# Patient Record
Sex: MALE | Race: WHITE | NOT HISPANIC OR LATINO | Employment: FULL TIME | URBAN - METROPOLITAN AREA
[De-identification: names, ages, dates, MRNs, and addresses within clinical notes are randomized per-mention and may not be internally consistent; named-entity substitution may affect disease eponyms.]

---

## 2024-01-05 ENCOUNTER — OFFICE VISIT (OUTPATIENT)
Dept: CARDIOLOGY | Facility: CLINIC | Age: 59
End: 2024-01-05
Payer: COMMERCIAL

## 2024-01-05 VITALS
OXYGEN SATURATION: 96 % | HEIGHT: 69 IN | DIASTOLIC BLOOD PRESSURE: 82 MMHG | BODY MASS INDEX: 30.87 KG/M2 | WEIGHT: 208.4 LBS | HEART RATE: 76 BPM | SYSTOLIC BLOOD PRESSURE: 122 MMHG

## 2024-01-05 DIAGNOSIS — Z82.49 FAMILY HISTORY OF HEART VALVE ABNORMALITY: Primary | ICD-10-CM

## 2024-01-05 DIAGNOSIS — E78.5 HYPERLIPIDEMIA, UNSPECIFIED HYPERLIPIDEMIA TYPE: ICD-10-CM

## 2024-01-05 RX ORDER — ASPIRIN 81 MG/1
TABLET ORAL
COMMUNITY

## 2024-01-05 RX ORDER — TRIAMCINOLONE ACETONIDE 1 MG/G
1 OINTMENT TOPICAL AS NEEDED
COMMUNITY

## 2024-01-05 RX ORDER — ALBUTEROL SULFATE 90 UG/1
AEROSOL, METERED RESPIRATORY (INHALATION)
COMMUNITY

## 2024-01-05 RX ORDER — TRIAMCINOLONE ACETONIDE 1 MG/G
1 CREAM TOPICAL AS NEEDED
COMMUNITY

## 2024-01-05 RX ORDER — FLUTICASONE FUROATE AND VILANTEROL TRIFENATATE 100; 25 UG/1; UG/1
1 POWDER RESPIRATORY (INHALATION) DAILY
COMMUNITY
Start: 2023-10-12

## 2024-01-05 NOTE — PROGRESS NOTES
"[facility] Cardiology  Consultation H&P  Matty Betancourt  1965  [address]  [place] [address]     VISIT DATE:  24    PCP: Maryellen Osei MD  [address]   [place] [facility] [place] [address]    IDENTIFICATION: A 58 y.o. male Rosalina dailey    PROBLEM LIST:  Asthma  HL    188/60/80H/97  Prediabetes   A1c 6.0  Family history of valvular heart disease  Surgical history:   Hernia repair    CC:  Chief Complaint   Patient presents with    F/H heart disease     consult       Allergies  Allergies   Allergen Reactions    Naproxen Unknown - Low Severity     Makes tongue raw       Current Medications  Current Outpatient Medications   Medication Instructions    albuterol sulfate  (90 Base) MCG/ACT inhaler     aspirin (ASPIR) 81 MG EC tablet Oral    Breo Ellipta 100-25 MCG/ACT aerosol powder  1 puff, Inhalation, Daily    triamcinolone (KENALOG) 0.1 % cream 1 application , As Needed    triamcinolone (KENALOG) 0.1 % ointment 1 application , Topical, As Needed        History of Present Illness   HPI  Matty Betancourt is a 58 y.o. year old male with the above mentioned PMH who presents for consult from Maryellen Osei PA-C for evaluation of cardiac risk due to family history of valvular heart disease.  His father had a TAVR at the age of 90 and  at age 92 from \"heart attack\".  Mother had a stroke in her 60s.  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  He denies history of hypertension, sleep apnea, or murmur.      Pt denies any chest pain, dyspnea at rest, dyspnea on exertion, orthopnea, PND, palpitations, lower extremity edema, or claudication. Pt denies history of CHF, DVT, PE, MI, CVA, TIA, or rheumatic fever.       ROS  Review of Systems   All other systems reviewed and are negative.      SOCIAL HX  Social History     Socioeconomic History    Marital status:    Tobacco Use    Smoking status: Never    Smokeless tobacco: Never   Vaping Use    Vaping Use: Never used " "  Substance and Sexual Activity    Alcohol use: Never    Drug use: Never       FAMILY HX  Family History   Problem Relation Age of Onset    Stroke Mother     Breast cancer Mother     Heart disease Father         TAVR and Carotid Heart Disease       Vitals:    01/05/24 0916   BP: 122/82   BP Location: Left arm   Patient Position: Sitting   Pulse: 76   SpO2: 96%   Weight: 94.5 kg (208 lb 6.4 oz)   Height: 175.3 cm (69\")     Body mass index is 30.78 kg/m².     PHYSICAL EXAMINATION:  Vitals reviewed.   Constitutional:       Appearance: Normal appearance. Well-developed and not in distress.   Neck:      Vascular: No JVD.   Pulmonary:      Effort: Pulmonary effort is normal.      Breath sounds: Normal breath sounds.   Cardiovascular:      Normal rate. Regular rhythm.      Murmurs: There is no murmur.   Pulses:     Intact distal pulses.   Edema:     Peripheral edema absent.   Neurological:      Mental Status: Alert.   Psychiatric:         Behavior: Behavior is cooperative.         Diagnostic Data:    ECG 12 Lead    Date/Time: 1/5/2024 9:34 AM  Performed by: Chuck Hahn MD    Authorized by: Chuck Hahn MD  Comparison: not compared with previous ECG   Previous ECG: no previous ECG available  Rhythm: sinus rhythm  Rate: normal  BPM: 76    Clinical impression: normal ECG        Labs:  12/1/2023  TSH 3.820  A1c 6.0  Lipid 188/60/80/97  CMP: GLU 93, BUN 17, CR 1.08, , K 4.8, AST 19, ALT 19  CBC normal      Advance Care Planning   ACP discussion was declined by the patient. Patient does not have an advance directive, declines further assistance.         ASSESSMENT:   Diagnosis Plan   1. Family history of heart valve abnormality  CT Cardiac Calcium Score Without Dye      2. Hyperlipidemia, unspecified hyperlipidemia type            PLAN:  Patient is a 58-year-old male with history of asthma, prediabetes, and hyperlipidemia who is here for cardiac evaluation due to family history of valvular heart disease.  He is " active and asymptomatic from a cardiac standpoint.   A1c 6.0, HDL 80, no murmur, normal EKG.  We will screen for CAD with CT cardiac calcium score.    Alda Corbett, APRN 01/05/24 09:38 EST     Maryellen Osei PA-C, thank you for referring Mr. Betancourt for evaluation.  I have forwarded my electronically generated recommendations to you for review.  Please do not hesitate to call with any questions.      Chuck Hahn MD, FACC

## 2024-01-29 ENCOUNTER — TELEPHONE (OUTPATIENT)
Dept: CARDIOLOGY | Facility: CLINIC | Age: 59
End: 2024-01-29

## 2024-01-29 NOTE — TELEPHONE ENCOUNTER
Caller: BECKI    Relationship: PROVIDER    Best call back number:     What form or medical record are you requesting: BUSHRA NOTE 01.05.24    Who is requesting this form or medical record from you: BECKI    How would you like to receive the form or medical records (pick-up, mail, fax): FAX  If fax, what is the fax number: [phone]      Additional notes:

## 2024-01-31 ENCOUNTER — TELEPHONE (OUTPATIENT)
Dept: CARDIOLOGY | Facility: CLINIC | Age: 59
End: 2024-01-31
Payer: COMMERCIAL

## 2024-01-31 NOTE — TELEPHONE ENCOUNTER
----- Message from Chuck Hahn MD sent at 1/29/2024 10:32 AM EST -----  Ccs 20  Recheck in 3 yrs  ----- Message -----  From: Justo, Scans Incoming  Sent: 1/26/2024   8:15 AM EST  To: Chuck Hahn MD